# Patient Record
Sex: MALE | Race: WHITE | Employment: FULL TIME | URBAN - METROPOLITAN AREA
[De-identification: names, ages, dates, MRNs, and addresses within clinical notes are randomized per-mention and may not be internally consistent; named-entity substitution may affect disease eponyms.]

---

## 2020-06-15 ENCOUNTER — OFFICE VISIT (OUTPATIENT)
Dept: ORTHOPEDIC SURGERY | Age: 20
End: 2020-06-15
Payer: COMMERCIAL

## 2020-06-15 VITALS — WEIGHT: 135 LBS | TEMPERATURE: 97.8 F | BODY MASS INDEX: 19.33 KG/M2 | HEIGHT: 70 IN | RESPIRATION RATE: 16 BRPM

## 2020-06-15 PROCEDURE — 26600 TREAT METACARPAL FRACTURE: CPT | Performed by: PHYSICIAN ASSISTANT

## 2020-06-15 PROCEDURE — 99203 OFFICE O/P NEW LOW 30 MIN: CPT | Performed by: PHYSICIAN ASSISTANT

## 2020-06-15 SDOH — HEALTH STABILITY: MENTAL HEALTH: HOW OFTEN DO YOU HAVE A DRINK CONTAINING ALCOHOL?: NEVER

## 2020-06-29 ENCOUNTER — OFFICE VISIT (OUTPATIENT)
Dept: ORTHOPEDIC SURGERY | Age: 20
End: 2020-06-29
Payer: COMMERCIAL

## 2020-06-29 VITALS — HEIGHT: 70 IN | TEMPERATURE: 98.1 F | WEIGHT: 135 LBS | BODY MASS INDEX: 19.33 KG/M2

## 2020-06-29 PROCEDURE — L3809 WHFO W/O JOINTS PRE OTS: HCPCS | Performed by: PHYSICIAN ASSISTANT

## 2020-06-29 PROCEDURE — 99024 POSTOP FOLLOW-UP VISIT: CPT | Performed by: PHYSICIAN ASSISTANT

## 2020-06-29 NOTE — PROGRESS NOTES
LEFT (MIN 3 VIEWS)    Koki Rodriguez TKO       Treatment Plan:    Patient is doing well 3 weeks out from his initial injury. His cast is removed at today's visit. X-rays are stable. He is fitted for a Velcro ulnar gutter splint. He can remove the splint to shower. He will also remove the splint several times daily when sitting to work on gentle wrist and finger range of motion. Patient will continue resting icing and elevating the left hand as needed. He is given another work note stating he is to do no work involving the left hand for another 4 weeks. He will plan on following back in 3 weeks or before that time with any concerns. We will repeat imaging of the left hand at that time. Salvador Phillips was informed of the results of any imaging. We discussed treatment options and a time was given to answer questions. A plan was proposed and Salvador Phillips understand and accepts this course of care. Electronically signed by Nora Costa PA-C on 3/09/5200  Board Certified [facility]    Please note that portions of this note were completed with a voice recognition program.  Efforts were made to edit the dictations but occasionally words are mis-transcribed.

## 2020-06-29 NOTE — LETTER
[facility]  [address] [place] [address]  Phone: [phone]  Fax: [phone]    Melody Syeda        June 29, 2020     Patient: Monroe Campos   YOB: 2000   Date of Visit: 6/29/2020       To Whom It May Concern: It is my medical opinion that Monroe Campos may return to light duty immediately with the following restrictions: no lifting, pushing or pulling with left upper extremity for 4 weeks. If you have any questions or concerns, please don't hesitate to call.     Sincerely,          Velasquez Daniels PA-C

## 2020-07-01 ENCOUNTER — TELEPHONE (OUTPATIENT)
Dept: ORTHOPEDIC SURGERY | Age: 20
End: 2020-07-01

## 2020-07-01 NOTE — TELEPHONE ENCOUNTER
07/01/2020   [facility]    APPROVED REF # [id number]. DATES: 06/30/2020 - 07/31/2020. PER FAX FROM [facility]. [facility]    NOTE: [facility] NOT CONTRACTED WITH THIS PLAN. PATIENT WILL BE BALANCE BILLED. *WHAT AMOUNT THE INSURANCE DOES NOT PAY THE PATIENT WILL BE BILLED. DED   $0  CO INS  75/25  OOP   $6350 / $84.42 MET  PER RONY @ [facility], CALL REF # [id number].

## 2020-07-20 ENCOUNTER — OFFICE VISIT (OUTPATIENT)
Dept: ORTHOPEDIC SURGERY | Age: 20
End: 2020-07-20

## 2020-07-20 VITALS — HEIGHT: 70 IN | BODY MASS INDEX: 19.04 KG/M2 | WEIGHT: 133 LBS | TEMPERATURE: 98.2 F

## 2020-07-20 PROCEDURE — 99024 POSTOP FOLLOW-UP VISIT: CPT | Performed by: PHYSICIAN ASSISTANT

## 2025-04-16 ENCOUNTER — OFFICE VISIT (OUTPATIENT)
Age: 25
End: 2025-04-16

## 2025-04-16 VITALS
OXYGEN SATURATION: 97 % | BODY MASS INDEX: 19.33 KG/M2 | WEIGHT: 135 LBS | HEIGHT: 70 IN | TEMPERATURE: 98.2 F | HEART RATE: 71 BPM | RESPIRATION RATE: 20 BRPM | SYSTOLIC BLOOD PRESSURE: 119 MMHG | DIASTOLIC BLOOD PRESSURE: 77 MMHG

## 2025-04-16 DIAGNOSIS — S93.602A SPRAIN OF LEFT FOOT, INITIAL ENCOUNTER: Primary | ICD-10-CM

## 2025-04-16 NOTE — PROGRESS NOTES
Phil Kelly (:  2000) is a 24 y.o. male,New patient, here for evaluation of the following chief complaint(s):  Foot Pain (Left foot pain x 2 day, )      ASSESSMENT/PLAN:  1. Sprain of left foot, initial encounter  - XR FOOT LEFT (MIN 3 VIEWS); Future  - diclofenac sodium (VOLTAREN) 1 % GEL; Apply 4 g topically 4 times daily for 7 days  Dispense: 56 g; Refill: 0  - Apply ace wrap       Return if symptoms worsen or fail to improve.    SUBJECTIVE/OBJECTIVE:  PRESENT TO CLINIC WITH LEFT FOOT PAIN FOR TWO DAYS      History provided by:  Patient  Foot Pain         Vitals:    25 1925   BP: 119/77   BP Site: Left Upper Arm   Patient Position: Sitting   BP Cuff Size: Medium Adult   Pulse: 71   Resp: 20   Temp: 98.2 °F (36.8 °C)   TempSrc: Oral   SpO2: 97%   Weight: 61.2 kg (135 lb)   Height: 1.778 m (5' 10\")       Review of Systems   Musculoskeletal:  Positive for arthralgias and myalgias.       Physical Exam  Constitutional:       Appearance: Normal appearance.   HENT:      Head: Normocephalic and atraumatic.      Nose: Nose normal.      Mouth/Throat:      Mouth: Mucous membranes are moist.   Eyes:      Pupils: Pupils are equal, round, and reactive to light.   Pulmonary:      Effort: Pulmonary effort is normal.      Breath sounds: Normal breath sounds.   Musculoskeletal:         General: Swelling, tenderness and signs of injury present. Normal range of motion.      Cervical back: Normal range of motion and neck supple.        Legs:       Comments: LEFT FOOT   Skin:     General: Skin is warm.   Neurological:      Mental Status: He is alert and oriented to person, place, and time.   Psychiatric:         Mood and Affect: Mood normal.         Behavior: Behavior normal.           An electronic signature was used to authenticate this note.    --Ross Gatica DO

## 2025-04-17 ENCOUNTER — RESULTS FOLLOW-UP (OUTPATIENT)
Age: 25
End: 2025-04-17